# Patient Record
Sex: MALE | Race: WHITE | Employment: OTHER | URBAN - METROPOLITAN AREA
[De-identification: names, ages, dates, MRNs, and addresses within clinical notes are randomized per-mention and may not be internally consistent; named-entity substitution may affect disease eponyms.]

---

## 2023-03-06 ENCOUNTER — HOSPITAL ENCOUNTER (EMERGENCY)
Age: 73
Discharge: HOME | End: 2023-03-06
Attending: EMERGENCY MEDICINE
Payer: OTHER GOVERNMENT

## 2023-03-06 ENCOUNTER — APPOINTMENT (OUTPATIENT)
Dept: CT IMAGING | Age: 73
End: 2023-03-06
Payer: OTHER GOVERNMENT

## 2023-03-06 VITALS
SYSTOLIC BLOOD PRESSURE: 186 MMHG | TEMPERATURE: 97.9 F | OXYGEN SATURATION: 96 % | RESPIRATION RATE: 18 BRPM | HEART RATE: 83 BPM | DIASTOLIC BLOOD PRESSURE: 86 MMHG

## 2023-03-06 DIAGNOSIS — N23 RENAL COLIC: Primary | ICD-10-CM

## 2023-03-06 LAB
ALBUMIN SERPL-MCNC: 3.9 G/DL (ref 3.2–4.6)
ALBUMIN/GLOB SERPL: 1 (ref 0.4–1.6)
ALP SERPL-CCNC: 87 U/L (ref 50–136)
ALT SERPL-CCNC: 37 U/L (ref 12–65)
ANION GAP SERPL CALC-SCNC: 5 MMOL/L (ref 2–11)
APPEARANCE UR: CLEAR
AST SERPL-CCNC: 28 U/L (ref 15–37)
BACTERIA URNS QL MICRO: 0 /HPF
BASOPHILS # BLD: 0.1 K/UL (ref 0–0.2)
BASOPHILS NFR BLD: 1 % (ref 0–2)
BILIRUB SERPL-MCNC: 0.4 MG/DL (ref 0.2–1.1)
BILIRUB UR QL: NEGATIVE
BUN SERPL-MCNC: 16 MG/DL (ref 8–23)
CALCIUM SERPL-MCNC: 9.5 MG/DL (ref 8.3–10.4)
CHLORIDE SERPL-SCNC: 103 MMOL/L (ref 101–110)
CO2 SERPL-SCNC: 29 MMOL/L (ref 21–32)
COLOR UR: ABNORMAL
CREAT SERPL-MCNC: 1.44 MG/DL (ref 0.8–1.5)
CRYSTALS URNS QL MICRO: ABNORMAL /LPF
DIFFERENTIAL METHOD BLD: NORMAL
EOSINOPHIL # BLD: 0.2 K/UL (ref 0–0.8)
EOSINOPHIL NFR BLD: 3 % (ref 0.5–7.8)
EPI CELLS #/AREA URNS HPF: ABNORMAL /HPF
ERYTHROCYTE [DISTWIDTH] IN BLOOD BY AUTOMATED COUNT: 13.4 % (ref 11.9–14.6)
GLOBULIN SER CALC-MCNC: 3.8 G/DL (ref 2.8–4.5)
GLUCOSE SERPL-MCNC: 150 MG/DL (ref 65–100)
GLUCOSE UR STRIP.AUTO-MCNC: NEGATIVE MG/DL
HCT VFR BLD AUTO: 44.8 % (ref 41.1–50.3)
HGB BLD-MCNC: 14.7 G/DL (ref 13.6–17.2)
HGB UR QL STRIP: ABNORMAL
IMM GRANULOCYTES # BLD AUTO: 0 K/UL (ref 0–0.5)
IMM GRANULOCYTES NFR BLD AUTO: 0 % (ref 0–5)
KETONES UR QL STRIP.AUTO: ABNORMAL MG/DL
LEUKOCYTE ESTERASE UR QL STRIP.AUTO: NEGATIVE
LYMPHOCYTES # BLD: 2.6 K/UL (ref 0.5–4.6)
LYMPHOCYTES NFR BLD: 36 % (ref 13–44)
MCH RBC QN AUTO: 28.5 PG (ref 26.1–32.9)
MCHC RBC AUTO-ENTMCNC: 32.8 G/DL (ref 31.4–35)
MCV RBC AUTO: 87 FL (ref 82–102)
MONOCYTES # BLD: 0.6 K/UL (ref 0.1–1.3)
MONOCYTES NFR BLD: 8 % (ref 4–12)
NEUTS SEG # BLD: 3.7 K/UL (ref 1.7–8.2)
NEUTS SEG NFR BLD: 52 % (ref 43–78)
NITRITE UR QL STRIP.AUTO: NEGATIVE
NRBC # BLD: 0 K/UL (ref 0–0.2)
OTHER OBSERVATIONS: ABNORMAL
PH UR STRIP: 6.5 (ref 5–9)
PLATELET # BLD AUTO: 235 K/UL (ref 150–450)
PMV BLD AUTO: 10.8 FL (ref 9.4–12.3)
POTASSIUM SERPL-SCNC: 3.5 MMOL/L (ref 3.5–5.1)
PROT SERPL-MCNC: 7.7 G/DL (ref 6.3–8.2)
PROT UR STRIP-MCNC: NEGATIVE MG/DL
RBC # BLD AUTO: 5.15 M/UL (ref 4.23–5.6)
RBC #/AREA URNS HPF: >100 /HPF
SODIUM SERPL-SCNC: 137 MMOL/L (ref 133–143)
SP GR UR REFRACTOMETRY: 1.02 (ref 1–1.02)
UROBILINOGEN UR QL STRIP.AUTO: 1 EU/DL (ref 0.2–1)
WBC # BLD AUTO: 7.2 K/UL (ref 4.3–11.1)
WBC URNS QL MICRO: ABNORMAL /HPF

## 2023-03-06 PROCEDURE — 96374 THER/PROPH/DIAG INJ IV PUSH: CPT

## 2023-03-06 PROCEDURE — 6360000002 HC RX W HCPCS: Performed by: EMERGENCY MEDICINE

## 2023-03-06 PROCEDURE — 6370000000 HC RX 637 (ALT 250 FOR IP): Performed by: EMERGENCY MEDICINE

## 2023-03-06 PROCEDURE — 81001 URINALYSIS AUTO W/SCOPE: CPT

## 2023-03-06 PROCEDURE — 80053 COMPREHEN METABOLIC PANEL: CPT

## 2023-03-06 PROCEDURE — 99284 EMERGENCY DEPT VISIT MOD MDM: CPT

## 2023-03-06 PROCEDURE — 74176 CT ABD & PELVIS W/O CONTRAST: CPT

## 2023-03-06 PROCEDURE — 85025 COMPLETE CBC W/AUTO DIFF WBC: CPT

## 2023-03-06 RX ORDER — HYDROCODONE BITARTRATE AND ACETAMINOPHEN 5; 325 MG/1; MG/1
1-2 TABLET ORAL EVERY 8 HOURS PRN
Qty: 20 TABLET | Refills: 0 | Status: SHIPPED | OUTPATIENT
Start: 2023-03-06 | End: 2023-03-09

## 2023-03-06 RX ORDER — TAMSULOSIN HYDROCHLORIDE 0.4 MG/1
0.4 CAPSULE ORAL DAILY
Qty: 10 CAPSULE | Refills: 0 | Status: SHIPPED | OUTPATIENT
Start: 2023-03-06

## 2023-03-06 RX ORDER — ONDANSETRON 8 MG/1
8 TABLET, ORALLY DISINTEGRATING ORAL EVERY 8 HOURS PRN
Qty: 12 TABLET | Refills: 1 | Status: SHIPPED | OUTPATIENT
Start: 2023-03-06

## 2023-03-06 RX ORDER — TAMSULOSIN HYDROCHLORIDE 0.4 MG/1
0.4 CAPSULE ORAL
Status: COMPLETED | OUTPATIENT
Start: 2023-03-06 | End: 2023-03-06

## 2023-03-06 RX ORDER — KETOROLAC TROMETHAMINE 30 MG/ML
30 INJECTION, SOLUTION INTRAMUSCULAR; INTRAVENOUS
Status: COMPLETED | OUTPATIENT
Start: 2023-03-06 | End: 2023-03-06

## 2023-03-06 RX ORDER — OXYCODONE HYDROCHLORIDE AND ACETAMINOPHEN 5; 325 MG/1; MG/1
2 TABLET ORAL
Status: COMPLETED | OUTPATIENT
Start: 2023-03-06 | End: 2023-03-06

## 2023-03-06 RX ADMIN — OXYCODONE AND ACETAMINOPHEN 2 TABLET: 5; 325 TABLET ORAL at 15:23

## 2023-03-06 RX ADMIN — TAMSULOSIN HYDROCHLORIDE 0.4 MG: 0.4 CAPSULE ORAL at 15:23

## 2023-03-06 RX ADMIN — KETOROLAC TROMETHAMINE 30 MG: 30 INJECTION, SOLUTION INTRAMUSCULAR; INTRAVENOUS at 15:23

## 2023-03-06 ASSESSMENT — ENCOUNTER SYMPTOMS
VOMITING: 0
ABDOMINAL PAIN: 1
NAUSEA: 0
BACK PAIN: 0

## 2023-03-06 ASSESSMENT — LIFESTYLE VARIABLES
HOW OFTEN DO YOU HAVE A DRINK CONTAINING ALCOHOL: MONTHLY OR LESS
HOW MANY STANDARD DRINKS CONTAINING ALCOHOL DO YOU HAVE ON A TYPICAL DAY: 1 OR 2

## 2023-03-06 ASSESSMENT — PAIN DESCRIPTION - LOCATION: LOCATION: FLANK

## 2023-03-06 ASSESSMENT — PAIN DESCRIPTION - ORIENTATION: ORIENTATION: RIGHT

## 2023-03-06 ASSESSMENT — PAIN SCALES - GENERAL: PAINLEVEL_OUTOF10: 10

## 2023-03-06 NOTE — DISCHARGE INSTRUCTIONS
Take flomax (starting tomorrow) - once a day till stone has passed    Zofran as needed for nasuea  Ibuprofen as needed for milder pain  norco as needed for stronger pain    Strain urine  Return to the e.r. if worse, fever>101, signs of urinary infection, or pain is out of control despite medicines    Follow up with urologist provided if stone hasn't passed in a few days

## 2023-03-06 NOTE — ED NOTES
I have reviewed discharge instructions with the patient. The patient verbalized understanding. Patient left ED via Discharge Method: ambulatory to Home with [facility]. Opportunity for questions and clarification provided. Patient given 3 scripts. To continue your aftercare when you leave the hospital, you may receive an automated call from our care team to check in on how you are doing. This is a free service and part of our promise to provide the best care and service to meet your aftercare needs.  If you have questions, or wish to unsubscribe from this service please call [phone]. Thank you for Choosing our [facility] Department.         Thomas Greene RN  03/06/23 0971

## 2023-03-06 NOTE — ED PROVIDER NOTES
Emergency Department Provider Note                   PCP:                Tonny Garcia MD               Age: 67 y.o. Sex: male     DISPOSITION       No diagnosis found. MEDICAL DECISION MAKING  Complexity of Problems Addressed:  1 or more acute illnesses that pose a threat to life or bodily function. Groin pain with a differential diagnosis of kidney stone, AAA, pyelonephritis, appendicitis, testicular torsion    Data Reviewed and Analyzed:  Category 1:   I reviewed records from an external source: provider visit notes from PCP. I reviewed records from an external source: previous lab results from outside ED. I reviewed records from an external source: previous imaging studies from outside ED. I ordered each unique test.  I reviewed the results of each unique test.    The patients assessment required an independent historian: Wife at bedside interviewed is independent historian for additional details    Category 2:     I independently ordered and reviewed the CT Scan. 79-year-old gentleman with flank and groin pain suggestive of renal colic. He has had 1 previous stone in the past and pass it on his own, urinalysis shows hematuria but no suggestion of infection, he does report some dysuria. CT scan shows small kidney stone    Category 3: Discussion of management or test interpretation. CT scan shows small kidney stone at the UVJ with several others in the pelvis, mild hydroureter, urinalysis shows hematuria with no suggestion of infection         Risk of Complications and/or Morbidity of Patient Management:  Patient was discharged risks and benefits of hospitalization were considered     Is this patient to be included in the SEP-1 core measure due to severe sepsis or septic shock? No Exclusion criteria - the patient is NOT to be included for SEP-1 Core Measure due to:  Infection is not suspected     Kaylin Kate is a 67 y.o. male who presents to the Emergency Department with chief complaint of Chief Complaint   Patient presents with    Dysuria      Chief complaint : Groin pain    HISTORY OF PRESENT ILLNESS :  Location : Right side radiating from flank    Quality : Sharp and stabbing    Quantity : Constant    Timing : Around 12:30 PM    Severity : Moderate    Context : Similar to prior renal colic, has only had 1 stone in the past, and passed spontaneously    Alleviating / exacerbating factors : No relief with Ultram which is a regular scheduled daily medicine    Associated Symptoms : No nausea or vomiting, no fevers, he does have some burning dysuria         Review of Systems   Constitutional:  Negative for chills and fever. Gastrointestinal:  Positive for abdominal pain. Negative for nausea and vomiting. Genitourinary:  Positive for dysuria and flank pain. Negative for hematuria. Musculoskeletal:  Negative for back pain. All other systems reviewed and are negative. Vitals signs and nursing note reviewed. Patient Vitals for the past 4 hrs:   Temp Pulse Resp BP SpO2   03/06/23 1500 97.9 °F (36.6 °C) 83 18 (!) 186/86 96 %          Physical Exam  Vitals and nursing note reviewed. Constitutional:       General: He is not in acute distress. Appearance: Normal appearance. He is not ill-appearing. HENT:      Head: Normocephalic and atraumatic. Right Ear: External ear normal.      Left Ear: External ear normal.      Nose: Nose normal. No rhinorrhea. Eyes:      General: No scleral icterus. Right eye: No discharge. Left eye: No discharge. Extraocular Movements: Extraocular movements intact. Cardiovascular:      Rate and Rhythm: Normal rate and regular rhythm. Pulmonary:      Effort: Pulmonary effort is normal. No respiratory distress. Breath sounds: Normal breath sounds. Abdominal:      General: Abdomen is flat. There is no distension. Tenderness: There is no abdominal tenderness. There is right CVA tenderness.  There is no left CVA tenderness, guarding or rebound. Musculoskeletal:         General: No signs of injury. Normal range of motion. Cervical back: Normal range of motion and neck supple. Skin:     General: Skin is warm and dry. Coloration: Skin is not jaundiced or pale. Neurological:      General: No focal deficit present. Mental Status: He is alert and oriented to person, place, and time. Mental status is at baseline.       Gait: Gait normal.   Psychiatric:         Mood and Affect: Mood normal.         Behavior: Behavior normal.        Procedures          Orders Placed This Encounter   Procedures    CT ABDOMEN PELVIS RENAL STONE    CBC with Auto Differential    Comprehensive Metabolic Panel        Medications   tamsulosin (FLOMAX) capsule 0.4 mg (has no administration in time range)   oxyCODONE-acetaminophen (PERCOCET) 5-325 MG per tablet 2 tablet (has no administration in time range)   ketorolac (TORADOL) injection 30 mg (has no administration in time range)       New Prescriptions    No medications on file        Past Medical History:   Diagnosis Date    Arthritis     OA    Chronic pain     lumbar/ legs    GERD (gastroesophageal reflux disease)     controlled with med    Hypercholesteremia     Hypertension     controlled with med    Morbid obesity ([address] 4/25/16    BMI- 45.4    Sleep apnea     uses CPAP        Past Surgical History:   Procedure Laterality Date    ORTHOPEDIC SURGERY Right     achilles tendon    OTHER SURGICAL HISTORY      sebaceous cyst        Family History   Problem Relation Age of Onset    Hypertension Father     Lung Disease Father         brown lung    Stroke Father     Cancer Mother         Social History     Socioeconomic History    Marital status:    Tobacco Use    Smoking status: Never    Smokeless tobacco: Never   Substance and Sexual Activity    Alcohol use: No        Allergies: Sulfamethoxazole-trimethoprim    Previous Medications    ACETAMINOPHEN (TYLENOL) 325 MG TABLET    Take 325 mg by mouth 3 times daily    ALFUZOSIN (UROXATRAL) 10 MG EXTENDED RELEASE TABLET    Take by mouth daily    CALCIUM CARBONATE-VITAMIN D (CALCIUM-VITAMIN D) 600-125 MG-UNIT TABS    Take by mouth Daily with supper    CARBOXYMETHYLCELLULOSE (THERATEARS) 1 % OPHTHALMIC GEL    1 drop as needed    FINASTERIDE (PROSCAR) 5 MG TABLET    Take 5 mg by mouth    GABAPENTIN (NEURONTIN) 600 MG TABLET    Take 600 mg by mouth 4 times daily.    HYDROCHLOROTHIAZIDE (HYDRODIURIL) 25 MG TABLET    Take 25 mg by mouth    HYDROCODONE-ACETAMINOPHEN (NORCO) 7.5-325 MG PER TABLET    Take by mouth.    MECLIZINE (ANTIVERT) 12.5 MG TABLET    Take by mouth 3 times daily as needed    METHYLPREDNISOLONE (MEDROL) 8 MG TABLET    Take by mouth    OMEPRAZOLE (PRILOSEC) 40 MG DELAYED RELEASE CAPSULE    Take 40 mg by mouth Daily with supper    SIMVASTATIN (ZOCOR) 20 MG TABLET    Take 20 mg by mouth    TAMSULOSIN (FLOMAX) 0.4 MG CAPSULE    Take 0.4 mg by mouth    TRAMADOL (ULTRAM) 50 MG TABLET    Take 100 mg by mouth 3 times daily.        No results found for any visits on 03/06/23.     CT ABDOMEN PELVIS RENAL STONE    (Results Pending)                     Voice dictation software was used during the making of this note.  This software is not perfect and grammatical and other typographical errors may be present.  This note has not been completely proofread for errors.       Ismael Henson MD  03/06/23 [id number]